# Patient Record
Sex: MALE | Race: WHITE | NOT HISPANIC OR LATINO | URBAN - METROPOLITAN AREA
[De-identification: names, ages, dates, MRNs, and addresses within clinical notes are randomized per-mention and may not be internally consistent; named-entity substitution may affect disease eponyms.]

---

## 2017-01-24 ENCOUNTER — AMBULATORY - HEALTHEAST (OUTPATIENT)
Dept: CARDIOLOGY | Facility: CLINIC | Age: 62
End: 2017-01-24

## 2017-01-25 ENCOUNTER — OFFICE VISIT - HEALTHEAST (OUTPATIENT)
Dept: CARDIOLOGY | Facility: CLINIC | Age: 62
End: 2017-01-25

## 2017-01-25 DIAGNOSIS — E66.9 OBESITY (BMI 35.0-39.9 WITHOUT COMORBIDITY): ICD-10-CM

## 2017-01-25 DIAGNOSIS — I25.10 CORONARY ARTERY DISEASE INVOLVING NATIVE CORONARY ARTERY OF NATIVE HEART WITHOUT ANGINA PECTORIS: ICD-10-CM

## 2017-01-25 DIAGNOSIS — E78.00 PURE HYPERCHOLESTEROLEMIA: ICD-10-CM

## 2017-01-25 DIAGNOSIS — N18.30 CKD (CHRONIC KIDNEY DISEASE) STAGE 3, GFR 30-59 ML/MIN (H): ICD-10-CM

## 2017-01-25 DIAGNOSIS — E78.1 HYPERTRIGLYCERIDEMIA: ICD-10-CM

## 2017-01-25 DIAGNOSIS — M54.50 LUMBAGO: ICD-10-CM

## 2017-01-25 ASSESSMENT — MIFFLIN-ST. JEOR: SCORE: 2296.74

## 2017-08-09 ENCOUNTER — COMMUNICATION - HEALTHEAST (OUTPATIENT)
Dept: CARDIOLOGY | Facility: CLINIC | Age: 62
End: 2017-08-09

## 2018-02-02 ENCOUNTER — RECORDS - HEALTHEAST (OUTPATIENT)
Dept: LAB | Facility: CLINIC | Age: 63
End: 2018-02-02

## 2018-02-02 LAB
ANION GAP SERPL CALCULATED.3IONS-SCNC: 12 MMOL/L (ref 5–18)
BUN SERPL-MCNC: 13 MG/DL (ref 8–22)
CALCIUM SERPL-MCNC: 9.4 MG/DL (ref 8.5–10.5)
CHLORIDE BLD-SCNC: 101 MMOL/L (ref 98–107)
CHOLEST SERPL-MCNC: 192 MG/DL
CO2 SERPL-SCNC: 27 MMOL/L (ref 22–31)
CREAT SERPL-MCNC: 0.93 MG/DL (ref 0.7–1.3)
FASTING STATUS PATIENT QL REPORTED: ABNORMAL
GFR SERPL CREATININE-BSD FRML MDRD: >60 ML/MIN/1.73M2
GLUCOSE BLD-MCNC: 104 MG/DL (ref 70–125)
HDLC SERPL-MCNC: 28 MG/DL
LDLC SERPL CALC-MCNC: 101 MG/DL
LDLC SERPL CALC-MCNC: ABNORMAL MG/DL
POTASSIUM BLD-SCNC: 4.5 MMOL/L (ref 3.5–5)
SODIUM SERPL-SCNC: 140 MMOL/L (ref 136–145)
TRIGL SERPL-MCNC: 651 MG/DL

## 2018-02-05 ASSESSMENT — MIFFLIN-ST. JEOR: SCORE: 2317.49

## 2018-02-06 ENCOUNTER — ANESTHESIA - HEALTHEAST (OUTPATIENT)
Dept: SURGERY | Facility: CLINIC | Age: 63
End: 2018-02-06

## 2018-02-06 ENCOUNTER — SURGERY - HEALTHEAST (OUTPATIENT)
Dept: SURGERY | Facility: CLINIC | Age: 63
End: 2018-02-06

## 2018-02-06 ASSESSMENT — MIFFLIN-ST. JEOR: SCORE: 2279.22

## 2018-03-27 ENCOUNTER — RECORDS - HEALTHEAST (OUTPATIENT)
Dept: ADMINISTRATIVE | Facility: OTHER | Age: 63
End: 2018-03-27

## 2018-03-27 ENCOUNTER — HOSPITAL ENCOUNTER (OUTPATIENT)
Dept: CT IMAGING | Facility: HOSPITAL | Age: 63
Discharge: HOME | End: 2018-03-27
Attending: FAMILY MEDICINE

## 2018-03-27 DIAGNOSIS — R07.89 RIGHT-SIDED CHEST WALL PAIN: ICD-10-CM

## 2018-03-27 DIAGNOSIS — Z86.718 HISTORY OF DVT IN ADULTHOOD: ICD-10-CM

## 2018-03-27 LAB
CREAT BLD-MCNC: 1.2 MG/DL
POC GFR AMER AF HE - HISTORICAL: >60 ML/MIN/1.73M2
POC GFR NON AMER AF HE - HISTORICAL: >60 ML/MIN/1.73M2

## 2018-06-14 ENCOUNTER — RECORDS - HEALTHEAST (OUTPATIENT)
Dept: ADMINISTRATIVE | Facility: OTHER | Age: 63
End: 2018-06-14

## 2018-06-14 ENCOUNTER — AMBULATORY - HEALTHEAST (OUTPATIENT)
Dept: CARDIOLOGY | Facility: CLINIC | Age: 63
End: 2018-06-14

## 2018-06-15 ENCOUNTER — OFFICE VISIT - HEALTHEAST (OUTPATIENT)
Dept: CARDIOLOGY | Facility: CLINIC | Age: 63
End: 2018-06-15

## 2018-06-15 DIAGNOSIS — I25.83 CORONARY ARTERY DISEASE DUE TO LIPID RICH PLAQUE: ICD-10-CM

## 2018-06-15 DIAGNOSIS — I25.10 CORONARY ARTERY DISEASE DUE TO LIPID RICH PLAQUE: ICD-10-CM

## 2018-06-15 DIAGNOSIS — R07.9 CHEST PAIN, UNSPECIFIED TYPE: ICD-10-CM

## 2018-06-15 RX ORDER — NITROGLYCERIN 0.4 MG/1
0.4 TABLET SUBLINGUAL EVERY 5 MIN PRN
Qty: 25 TABLET | Refills: 2 | Status: SHIPPED | OUTPATIENT
Start: 2018-06-15 | End: 2019-06-15

## 2018-06-15 ASSESSMENT — MIFFLIN-ST. JEOR: SCORE: 2362.85

## 2018-06-21 ENCOUNTER — HOSPITAL ENCOUNTER (OUTPATIENT)
Dept: NUCLEAR MEDICINE | Facility: CLINIC | Age: 63
Discharge: HOME | End: 2018-06-21
Attending: INTERNAL MEDICINE

## 2018-06-21 ENCOUNTER — HOSPITAL ENCOUNTER (OUTPATIENT)
Dept: CARDIOLOGY | Facility: CLINIC | Age: 63
Discharge: HOME | End: 2018-06-21
Attending: INTERNAL MEDICINE

## 2018-06-21 DIAGNOSIS — I25.83 CORONARY ARTERY DISEASE DUE TO LIPID RICH PLAQUE: ICD-10-CM

## 2018-06-21 DIAGNOSIS — I25.10 CORONARY ARTERY DISEASE DUE TO LIPID RICH PLAQUE: ICD-10-CM

## 2018-06-21 DIAGNOSIS — R07.9 CHEST PAIN, UNSPECIFIED TYPE: ICD-10-CM

## 2018-06-21 LAB
CV STRESS CURRENT BP HE: NORMAL
CV STRESS CURRENT HR HE: 100
CV STRESS CURRENT HR HE: 100
CV STRESS CURRENT HR HE: 101
CV STRESS CURRENT HR HE: 102
CV STRESS CURRENT HR HE: 103
CV STRESS CURRENT HR HE: 105
CV STRESS CURRENT HR HE: 109
CV STRESS CURRENT HR HE: 115
CV STRESS CURRENT HR HE: 118
CV STRESS CURRENT HR HE: 122
CV STRESS CURRENT HR HE: 133
CV STRESS CURRENT HR HE: 135
CV STRESS CURRENT HR HE: 135
CV STRESS CURRENT HR HE: 149
CV STRESS CURRENT HR HE: 151
CV STRESS CURRENT HR HE: 151
CV STRESS CURRENT HR HE: 80
CV STRESS CURRENT HR HE: 86
CV STRESS CURRENT HR HE: 89
CV STRESS CURRENT HR HE: 91
CV STRESS CURRENT HR HE: 93
CV STRESS CURRENT HR HE: 93
CV STRESS CURRENT HR HE: 98
CV STRESS DEVIATION TIME HE: NORMAL
CV STRESS ECHO PERCENT HR HE: NORMAL
CV STRESS EXERCISE STAGE HE: NORMAL
CV STRESS EXERCISE STAGE REACHED HE: NORMAL
CV STRESS FINAL RESTING BP HE: NORMAL
CV STRESS FINAL RESTING HR HE: 89
CV STRESS MAX HR HE: 151
CV STRESS MAX TREADMILL GRADE HE: 12
CV STRESS MAX TREADMILL SPEED HE: 2.5
CV STRESS PEAK DIA BP HE: NORMAL
CV STRESS PEAK SYS BP HE: NORMAL
CV STRESS PHASE HE: NORMAL
CV STRESS PROTOCOL HE: NORMAL
CV STRESS RESTING PT POSITION HE: NORMAL
CV STRESS ST DEVIATION AMOUNT HE: NORMAL
CV STRESS ST DEVIATION ELEVATION HE: NORMAL
CV STRESS ST EVELATION AMOUNT HE: NORMAL
CV STRESS TEST TYPE HE: NORMAL
CV STRESS TOTAL STAGE TIME MIN 1 HE: NORMAL
NUC STRESS EJECTION FRACTION: 73 %
STRESS ECHO BASELINE BP: NORMAL
STRESS ECHO BASELINE HR: 75
STRESS ECHO CALCULATED PERCENT HR: 95 %
STRESS ECHO LAST STRESS BP: NORMAL
STRESS ECHO LAST STRESS HR: 149
STRESS ECHO POST ESTIMATED WORKLOAD: 6.8
STRESS ECHO POST EXERCISE DUR MIN: 4
STRESS ECHO POST EXERCISE DUR SEC: 52
STRESS ECHO TARGET HR: 135

## 2018-06-22 ENCOUNTER — RECORDS - HEALTHEAST (OUTPATIENT)
Dept: NUCLEAR MEDICINE | Facility: CLINIC | Age: 63
End: 2018-06-22

## 2018-06-22 DIAGNOSIS — I25.83 CORONARY ATHEROSCLEROSIS DUE TO LIPID RICH PLAQUE (CODE): ICD-10-CM

## 2018-06-22 DIAGNOSIS — R07.9 CHEST PAIN, UNSPECIFIED: ICD-10-CM

## 2018-06-22 DIAGNOSIS — I25.10 ATHEROSCLEROTIC HEART DISEASE OF NATIVE CORONARY ARTERY WITHOUT ANGINA PECTORIS: ICD-10-CM

## 2018-06-25 ENCOUNTER — AMBULATORY - HEALTHEAST (OUTPATIENT)
Dept: CARDIOLOGY | Facility: CLINIC | Age: 63
End: 2018-06-25

## 2018-06-25 DIAGNOSIS — I10 HTN (HYPERTENSION): ICD-10-CM

## 2018-06-25 RX ORDER — LISINOPRIL 10 MG/1
10 TABLET ORAL DAILY
Qty: 30 TABLET | Refills: 11 | Status: SHIPPED | OUTPATIENT
Start: 2018-06-25

## 2018-08-02 ENCOUNTER — OFFICE VISIT - HEALTHEAST (OUTPATIENT)
Dept: CARDIOLOGY | Facility: CLINIC | Age: 63
End: 2018-08-02

## 2018-08-02 DIAGNOSIS — I25.10 CORONARY ARTERY DISEASE DUE TO LIPID RICH PLAQUE: ICD-10-CM

## 2018-08-02 DIAGNOSIS — E78.5 DYSLIPIDEMIA, GOAL LDL BELOW 70: ICD-10-CM

## 2018-08-02 DIAGNOSIS — I25.83 CORONARY ARTERY DISEASE DUE TO LIPID RICH PLAQUE: ICD-10-CM

## 2018-08-02 RX ORDER — VALACYCLOVIR HYDROCHLORIDE 500 MG/1
500 TABLET, FILM COATED ORAL 2 TIMES DAILY PRN
Status: SHIPPED | COMMUNITY
Start: 2018-07-15

## 2018-08-02 RX ORDER — ACETAMINOPHEN 500 MG
500 TABLET ORAL EVERY 6 HOURS PRN
Status: SHIPPED | COMMUNITY
Start: 2018-08-02

## 2018-08-02 ASSESSMENT — MIFFLIN-ST. JEOR: SCORE: 2349.24

## 2018-08-14 ENCOUNTER — RECORDS - HEALTHEAST (OUTPATIENT)
Dept: LAB | Facility: CLINIC | Age: 63
End: 2018-08-14

## 2018-08-15 LAB
ANION GAP SERPL CALCULATED.3IONS-SCNC: 13 MMOL/L (ref 5–18)
BUN SERPL-MCNC: 16 MG/DL (ref 8–22)
CALCIUM SERPL-MCNC: 9.8 MG/DL (ref 8.5–10.5)
CHLORIDE BLD-SCNC: 104 MMOL/L (ref 98–107)
CHOLEST SERPL-MCNC: 122 MG/DL
CO2 SERPL-SCNC: 25 MMOL/L (ref 22–31)
CREAT SERPL-MCNC: 1.3 MG/DL (ref 0.7–1.3)
FASTING STATUS PATIENT QL REPORTED: NO
GFR SERPL CREATININE-BSD FRML MDRD: 56 ML/MIN/1.73M2
GLUCOSE BLD-MCNC: 103 MG/DL (ref 70–125)
HDLC SERPL-MCNC: 27 MG/DL
LDLC SERPL CALC-MCNC: 19 MG/DL
POTASSIUM BLD-SCNC: 4.5 MMOL/L (ref 3.5–5)
SODIUM SERPL-SCNC: 142 MMOL/L (ref 136–145)
TRIGL SERPL-MCNC: 378 MG/DL

## 2019-02-18 ENCOUNTER — RECORDS - HEALTHEAST (OUTPATIENT)
Dept: LAB | Facility: CLINIC | Age: 64
End: 2019-02-18

## 2019-02-18 LAB
ALBUMIN SERPL-MCNC: 4.2 G/DL (ref 3.5–5)
ALP SERPL-CCNC: 108 U/L (ref 45–120)
ALT SERPL W P-5'-P-CCNC: 48 U/L (ref 0–45)
ANION GAP SERPL CALCULATED.3IONS-SCNC: 10 MMOL/L (ref 5–18)
AST SERPL W P-5'-P-CCNC: 21 U/L (ref 0–40)
BASOPHILS # BLD AUTO: 0.1 THOU/UL (ref 0–0.2)
BASOPHILS NFR BLD AUTO: 1 % (ref 0–2)
BILIRUB SERPL-MCNC: 1.4 MG/DL (ref 0–1)
BUN SERPL-MCNC: 15 MG/DL (ref 8–22)
CALCIUM SERPL-MCNC: 9.6 MG/DL (ref 8.5–10.5)
CHLORIDE BLD-SCNC: 102 MMOL/L (ref 98–107)
CHOLEST SERPL-MCNC: 143 MG/DL
CO2 SERPL-SCNC: 26 MMOL/L (ref 22–31)
CREAT SERPL-MCNC: 0.96 MG/DL (ref 0.7–1.3)
EOSINOPHIL # BLD AUTO: 0.3 THOU/UL (ref 0–0.4)
EOSINOPHIL NFR BLD AUTO: 3 % (ref 0–6)
ERYTHROCYTE [DISTWIDTH] IN BLOOD BY AUTOMATED COUNT: 12.5 % (ref 11–14.5)
FASTING STATUS PATIENT QL REPORTED: NO
GFR SERPL CREATININE-BSD FRML MDRD: >60 ML/MIN/1.73M2
GLUCOSE BLD-MCNC: 107 MG/DL (ref 70–125)
HCT VFR BLD AUTO: 47.7 % (ref 40–54)
HDLC SERPL-MCNC: 28 MG/DL
HGB BLD-MCNC: 16.1 G/DL (ref 14–18)
LDLC SERPL CALC-MCNC: 43 MG/DL
LYMPHOCYTES # BLD AUTO: 2.8 THOU/UL (ref 0.8–4.4)
LYMPHOCYTES NFR BLD AUTO: 32 % (ref 20–40)
MCH RBC QN AUTO: 32 PG (ref 27–34)
MCHC RBC AUTO-ENTMCNC: 33.8 G/DL (ref 32–36)
MCV RBC AUTO: 95 FL (ref 80–100)
MONOCYTES # BLD AUTO: 0.5 THOU/UL (ref 0–0.9)
MONOCYTES NFR BLD AUTO: 6 % (ref 2–10)
NEUTROPHILS # BLD AUTO: 5.1 THOU/UL (ref 2–7.7)
NEUTROPHILS NFR BLD AUTO: 59 % (ref 50–70)
PLATELET # BLD AUTO: 222 THOU/UL (ref 140–440)
PMV BLD AUTO: 10.9 FL (ref 8.5–12.5)
POTASSIUM BLD-SCNC: 4.5 MMOL/L (ref 3.5–5)
PROT SERPL-MCNC: 6.9 G/DL (ref 6–8)
PSA SERPL-MCNC: 0.3 NG/ML (ref 0–4.5)
RBC # BLD AUTO: 5.03 MILL/UL (ref 4.4–6.2)
SODIUM SERPL-SCNC: 138 MMOL/L (ref 136–145)
TRIGL SERPL-MCNC: 361 MG/DL
WBC: 8.8 THOU/UL (ref 4–11)

## 2020-01-01 ENCOUNTER — RECORDS - HEALTHEAST (OUTPATIENT)
Dept: LAB | Facility: CLINIC | Age: 65
End: 2020-01-01

## 2020-01-01 LAB
25(OH)D3 SERPL-MCNC: 27.3 NG/ML (ref 30–80)
ALBUMIN SERPL-MCNC: 3.8 G/DL (ref 3.5–5)
ALBUMIN SERPL-MCNC: 4.2 G/DL (ref 3.5–5)
ALP SERPL-CCNC: 109 U/L (ref 45–120)
ALP SERPL-CCNC: 111 U/L (ref 45–120)
ALT SERPL W P-5'-P-CCNC: 49 U/L (ref 0–45)
ALT SERPL W P-5'-P-CCNC: 51 U/L (ref 0–45)
ANION GAP SERPL CALCULATED.3IONS-SCNC: 12 MMOL/L (ref 5–18)
ANION GAP SERPL CALCULATED.3IONS-SCNC: 8 MMOL/L (ref 5–18)
AST SERPL W P-5'-P-CCNC: 22 U/L (ref 0–40)
AST SERPL W P-5'-P-CCNC: 27 U/L (ref 0–40)
BACTERIA SPEC CULT: NO GROWTH
BILIRUB SERPL-MCNC: 0.5 MG/DL (ref 0–1)
BILIRUB SERPL-MCNC: 1 MG/DL (ref 0–1)
BUN SERPL-MCNC: 14 MG/DL (ref 8–22)
BUN SERPL-MCNC: 15 MG/DL (ref 8–22)
CALCIUM SERPL-MCNC: 9.5 MG/DL (ref 8.5–10.5)
CALCIUM SERPL-MCNC: 9.9 MG/DL (ref 8.5–10.5)
CHLORIDE BLD-SCNC: 102 MMOL/L (ref 98–107)
CHLORIDE BLD-SCNC: 103 MMOL/L (ref 98–107)
CHOLEST SERPL-MCNC: 143 MG/DL
CO2 SERPL-SCNC: 25 MMOL/L (ref 22–31)
CO2 SERPL-SCNC: 30 MMOL/L (ref 22–31)
CREAT SERPL-MCNC: 0.94 MG/DL (ref 0.7–1.3)
CREAT SERPL-MCNC: 1.42 MG/DL (ref 0.7–1.3)
FASTING STATUS PATIENT QL REPORTED: NO
GFR SERPL CREATININE-BSD FRML MDRD: 50 ML/MIN/1.73M2
GFR SERPL CREATININE-BSD FRML MDRD: >60 ML/MIN/1.73M2
GLUCOSE BLD-MCNC: 109 MG/DL (ref 70–125)
GLUCOSE BLD-MCNC: 126 MG/DL (ref 70–125)
HDLC SERPL-MCNC: 28 MG/DL
LDLC SERPL CALC-MCNC: 61 MG/DL
POTASSIUM BLD-SCNC: 4.3 MMOL/L (ref 3.5–5)
POTASSIUM BLD-SCNC: 4.7 MMOL/L (ref 3.5–5)
PROT SERPL-MCNC: 6.7 G/DL (ref 6–8)
PROT SERPL-MCNC: 6.8 G/DL (ref 6–8)
PSA SERPL-MCNC: 0.4 NG/ML (ref 0–4.5)
SODIUM SERPL-SCNC: 140 MMOL/L (ref 136–145)
SODIUM SERPL-SCNC: 140 MMOL/L (ref 136–145)
TRIGL SERPL-MCNC: 268 MG/DL
TSH SERPL DL<=0.005 MIU/L-ACNC: 2.18 UIU/ML (ref 0.3–5)

## 2020-01-30 ENCOUNTER — AMBULATORY - HEALTHEAST (OUTPATIENT)
Dept: PALLIATIVE MEDICINE | Facility: OTHER | Age: 65
End: 2020-01-30

## 2020-01-30 DIAGNOSIS — M54.50 BILATERAL LOW BACK PAIN WITHOUT SCIATICA: ICD-10-CM

## 2020-02-04 ENCOUNTER — AMBULATORY - HEALTHEAST (OUTPATIENT)
Dept: ADMINISTRATIVE | Facility: REHABILITATION | Age: 65
End: 2020-02-04

## 2020-02-04 DIAGNOSIS — M54.50 BILATERAL LOW BACK PAIN WITHOUT SCIATICA: ICD-10-CM

## 2020-02-06 ENCOUNTER — OFFICE VISIT - HEALTHEAST (OUTPATIENT)
Dept: PHYSICAL THERAPY | Facility: CLINIC | Age: 65
End: 2020-02-06

## 2020-02-06 DIAGNOSIS — M54.50 LUMBAR SPINE PAINFUL ON MOVEMENT: ICD-10-CM

## 2020-02-06 DIAGNOSIS — G89.29 CHRONIC MIDLINE LOW BACK PAIN WITHOUT SCIATICA: ICD-10-CM

## 2020-02-06 DIAGNOSIS — M54.50 CHRONIC MIDLINE LOW BACK PAIN WITHOUT SCIATICA: ICD-10-CM

## 2020-02-13 ENCOUNTER — OFFICE VISIT - HEALTHEAST (OUTPATIENT)
Dept: PHYSICAL THERAPY | Facility: CLINIC | Age: 65
End: 2020-02-13

## 2020-02-13 DIAGNOSIS — M54.50 LUMBAR SPINE PAINFUL ON MOVEMENT: ICD-10-CM

## 2020-02-13 DIAGNOSIS — G89.29 CHRONIC MIDLINE LOW BACK PAIN WITHOUT SCIATICA: ICD-10-CM

## 2020-02-13 DIAGNOSIS — M54.50 CHRONIC MIDLINE LOW BACK PAIN WITHOUT SCIATICA: ICD-10-CM

## 2020-02-27 ENCOUNTER — OFFICE VISIT - HEALTHEAST (OUTPATIENT)
Dept: PHYSICAL THERAPY | Facility: CLINIC | Age: 65
End: 2020-02-27

## 2020-02-27 DIAGNOSIS — M54.50 CHRONIC MIDLINE LOW BACK PAIN WITHOUT SCIATICA: ICD-10-CM

## 2020-02-27 DIAGNOSIS — M54.50 LUMBAR SPINE PAINFUL ON MOVEMENT: ICD-10-CM

## 2020-02-27 DIAGNOSIS — G89.29 CHRONIC MIDLINE LOW BACK PAIN WITHOUT SCIATICA: ICD-10-CM

## 2021-01-01 ENCOUNTER — AMBULATORY - HEALTHEAST (OUTPATIENT)
Dept: CARDIOLOGY | Facility: CLINIC | Age: 66
End: 2021-01-01

## 2021-01-01 ENCOUNTER — RECORDS - HEALTHEAST (OUTPATIENT)
Dept: ADMINISTRATIVE | Facility: OTHER | Age: 66
End: 2021-01-01

## 2021-05-28 ENCOUNTER — RECORDS - HEALTHEAST (OUTPATIENT)
Dept: ADMINISTRATIVE | Facility: CLINIC | Age: 66
End: 2021-05-28

## 2021-05-30 VITALS — WEIGHT: 309.8 LBS | HEIGHT: 76 IN | BODY MASS INDEX: 37.72 KG/M2

## 2021-05-31 ENCOUNTER — RECORDS - HEALTHEAST (OUTPATIENT)
Dept: ADMINISTRATIVE | Facility: CLINIC | Age: 66
End: 2021-05-31

## 2021-06-01 ENCOUNTER — RECORDS - HEALTHEAST (OUTPATIENT)
Dept: ADMINISTRATIVE | Facility: CLINIC | Age: 66
End: 2021-06-01

## 2021-06-01 VITALS — BODY MASS INDEX: 39.17 KG/M2 | HEIGHT: 75 IN | WEIGHT: 315 LBS

## 2021-06-01 VITALS — BODY MASS INDEX: 38.37 KG/M2 | HEIGHT: 75 IN | WEIGHT: 308.56 LBS

## 2021-06-01 VITALS — BODY MASS INDEX: 39.17 KG/M2 | WEIGHT: 315 LBS | HEIGHT: 75 IN

## 2021-06-02 ENCOUNTER — RECORDS - HEALTHEAST (OUTPATIENT)
Dept: ADMINISTRATIVE | Facility: CLINIC | Age: 66
End: 2021-06-02

## 2021-06-05 NOTE — PROGRESS NOTES
[facility]   [facility]-Pel[facility] Initial Evaluation    Patient Name: Tito Dexter  Date of evaluation: 2/6/2020  Referral Diagnosis: Bilateral low back pain without sciatica  Referring provider: Sharath Smith MD  Visit Diagnosis:     ICD-10-CM    1. Chronic midline low back pain without sciatica M54.5     G89.29    2. Lumbar spine painful on movement M54.5        Assessment:        Patient is a 64 y.o. male that presents with signs and symptoms consistent with acute on chronic low back pain secondary to possible degenerative discs and facet arthropathy. Patient demonstrates impairments including decreased lumbar range of motion, joint mobility and flexibility, with + SLR leading to impaired functional mobility. Patient's functional limitations include sitting standing or walking for longer than 30 minutes, sit to stand transfers, sleeping comfortably at night, and working as . Today patient was tested on lumbar MedX and demonstrates below average strength and range of motion for age matched norms, with slight inc of pain at end range extension.    Patient educated, demonstrated understanding and is in agreement with nature of impairment, plan of care, patient role and HEP. Patient compliant with PT and prognosis is good. Patient would benefit from skilled PT to progress and improve above impairments.    The POC is dynamic and will be modified on an ongoing basis.  Patient will return to clinic if symptoms persist.  Barriers to achieving goals as noted in the assessment section may affect outcome.  Prognosis to achieve goals is  fair   Pt. is appropriate for skilled PT intervention as outlined in the Plan of Care (POC).  Pt. is a good candidate for skilled PT services to improve pain levels and function.    Goals:  Pt. will be independent with home exercise program in : 6 weeks  Pt. will have improved quality of sleep: with less pain;waking less times/night;in 6 weeks  Pt. will be able  to walk : 30 minutes;on uneven/inclined surfaces;with less pain;with less difficulty;for community mobility;for exercise/recreation;in 6 weeks  Patient will stand : 30 minutes;with less pain;with less difficultty;for home chores;for work;in 6 weeks    Pt will: demonstrate improved lumbar MEDX lumbar strength by 30# by 6 weeks.  Pt will: be able to bend forwards and backwards with no reproduction of pain symptoms by 6 weeks.       Patient's expectations/goals are realistic.    Barriers to Learning or Achieving Goals:  Non- adherence to the home exercise program.  Chronicity of the problem.       Plan / Patient Instructions:        Plan of Care:   Communication with: Referral Source  Patient Related Instruction: Nature of Condition;Treatment plan and rationale;Self Care instruction;Basis of treatment;Body mechanics;Posture;Next steps;Expected outcome  Times per Week: 1-2  Number of Weeks: 6-8  Number of Visits: 12  Discharge Planning: independent HEP and/or plateau of progress  Therapeutic Exercise: ROM;Stretching;Strengthening  Neuromuscular Reeducation: kinesio tape;posture;balance/proprioception;TNE;core  Manual Therapy: soft tissue mobilization;myofascial release;joint mobilization;muscle energy  Modalities: TENS  Gait Training: as indicated      POC and pathology of condition were reviewed with patient.  Pt. is in agreement with the Plan of Care  A Home Exercise Program (HEP) was initiated today.  Pt. was instructed in exercises by PT and patient was given a handout with detailed instructions.    Plan for next visit: lumbar DE and rotary torso if appropriate, strengthening exercises, extension exercises, posture review, HF and hamstring stretching, QL stretch     Subjective:         History of Present Illness:    Tito is a 64 y.o. male who presents to therapy today with complaints of constant back pain, has been going on for about 7 years. Date of onset is January 2020 and onset was gradual. Symptoms are  constant and not improving. Patient reports he has had about 4-5 radiofrequency ablation and this one doesn't seem to be helping his pain. He reports  a constant  history of similar symptoms. He describes their previous level of function as not limited. Patient did take work off this last week due to his low back pain, he is a . Patient did get the injections here previously.     Pain Ratin - normal pain, most of the pain is running down his legs  Pain rating at best: 4  Pain rating at worst: 8  Pain description: aching and shooting - down the legs    Functional limitations are described as occurring with:   bending  lifting  performing routine daily activities  sleeping  standing for longer periods of time, for about 30 minutes  transitional movements getting in  bed and car and getting out of  bed and car  walking for about 30 minutes    Patient reports benefit from:  rest  , anti-inflammatory, heat, cold, stretching         Objective:      Note: Items left blank indicates the item was not performed or not indicated at the time of the evaluation.    Patient Outcome Measures :    Modified Oswestry Low Back Pain Disablity Questionnaire  in %: 38     Scores range from 0-100%, where a score of 0% represents minimal pain and maximal function. The minimal clinically important difference is a score reduction of 12%.    Examination  1. Chronic midline low back pain without sciatica     2. Lumbar spine painful on movement       Involved side: Bilateral  Posture Observation:      General sitting posture is  normal.  General standing posture is normal.    Lumbar ROM:    Date: 2020     *Indicate scale AROM AROM AROM   Lumbar Flexion Fingers to knees + gowers     Lumbar Extension Limited with P      Right Left Right Left Right Left   Lumbar Sidebending  P on R P       Lumbar Rotation         Thoracic Flexion      Thoracic Extension      Thoracic Sidebending         Thoracic Rotation           Lower  Extremity Strength:   WFL no pain    Lumbar Special Tests:     Lumbar Special Tests Right Left SI Tests Right  Left   Quadrant test   SI Compression     Straight leg raise + + SI Distraction     Crossover response   POSH Test     Slump - - weakness Sacral Thrust     Sit-up test  FADIR     Trunk extensor endurance test  Resisted Abduction     Prone instability test  Other:     Pubic shotgun  Other:       Repeated Motion Testing:  Does not centralize  Does not peripheralize    Passive Mobility - Joint Integrity:  Hypomobile    Palpation: TTP lumbar paraspinals, QL, piriformis  Flexibility: decreased of HF, hamstrings      Treatment Today       Patient Instruction:  EDU on MedX and importance of exercise consistency, activity modification    Lumbar MedX Initial testing 2/6/2020 4-week Re-test   AROM (full=  0-72  lumbar) 0-30    Max Extension Torque  192#    Average Extension Torque 148#    Flex: ext ratio (ideal 1.4:1) 2.21:1          Exercises:  Exercise #1: supine knee rocks - 20 reps  Comment #1: SKTC, piriformis and lumbar rotation stretch - hold 30 sec          TREATMENT MINUTES COMMENTS   Evaluation 20    Self-care/ Home management     Manual therapy     Neuromuscular Re-education     Therapeutic Activity     Therapeutic Exercises 25 See flowsheet   Gait training     Modality__________________                Total 45 Patient arrived late   Blank areas are intentional and mean the treatment did not include these items.     PT Evaluation Code: (Please list factors)  Patient History/Comorbidities:   Patient Active Problem List   Diagnosis     Morbid obesity with BMI of 40.0-44.9, adult (H)     Chronic back pain     CKD (chronic kidney disease) stage 3, GFR 30-59 ml/min (H)     Coronary artery disease due to lipid rich plaque     Dyslipidemia, goal LDL below 70       Examination: decreased mobility increased pain  Clinical Presentation: stable  Clinical Decision Making: low    Patient History/  Comorbidities  Examination  (body structures and functions, activity limitations, and/or participation restrictions) Clinical Presentation Clinical Decision Making (Complexity)   No documented Comorbidities or personal factors 1-2 Elements Stable and/or uncomplicated Low   1-2 documented comorbidities or personal factor 3 Elements Evolving clinical presentation with changing characteristics Moderate   3-4 documented comorbidities or personal factors 4 or more Unstable and unpredictable High                Mercedez Reyes, PT  2/6/2020  8:29 AM

## 2021-06-06 NOTE — PROGRESS NOTES
[facility] Rehabilitation Daily Progress     Patient Name: Tito Dexter  Date: 2/13/2020  Visit #: 2/12  Referral Diagnosis: chronic low back pain  Referring provider: Sharath Smith MD  Visit Diagnosis:     ICD-10-CM    1. Chronic midline low back pain without sciatica M54.5     G89.29    2. Lumbar spine painful on movement M54.5        Assessment:     Today patient reports he feels about the same. His doctor says that he should maybe retire early instead of waiting 3 years, as he does have a lot of his pain and bad days the times that he works, for 10 hours straight. Tolerated lumbar DE today, slight inc of catching pain with rotary torso today, but was able to relieve symptoms with LTR.    From Eval:  Patient is a 64 y.o. male that presents with signs and symptoms consistent with acute on chronic low back pain secondary to possible degenerative discs and facet arthropathy. Patient demonstrates impairments including decreased lumbar range of motion, joint mobility and flexibility, with + SLR leading to impaired functional mobility. Patient's functional limitations include sitting standing or walking for longer than 30 minutes, sit to stand transfers, sleeping comfortably at night, and working as . Today patient was tested on lumbar MedX and demonstrates below average strength and range of motion for age matched norms, with slight inc of pain at end range extension.      HEP/POC compliance is  good .  Patient demonstrates understanding/independence with home program.  Patient is appropriate to continue with skilled physical therapy intervention, as indicated by initial plan of care.    Goal Status:  Pt. will be independent with home exercise program in : 6 weeks  Pt. will have improved quality of sleep: with less pain;waking less times/night;in 6 weeks  Pt. will be able to walk : 30 minutes;on uneven/inclined surfaces;with less pain;with less difficulty;for community mobility;for  exercise/recreation;in 6 weeks  Patient will stand : 30 minutes;with less pain;with less difficultty;for home chores;for work;in 6 weeks    Pt will: demonstrate improved lumbar MEDX lumbar strength by 30# by 6 weeks.  Pt will: be able to bend forwards and backwards with no reproduction of pain symptoms by 6 weeks.         Plan / Patient Education:     Continue with initial plan of care.  Progress with home program as tolerated.    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 Used heat and ice to help reduce symptoms yesterday.     Of note: Patient did take work off this last week due to his low back pain, he is a .    Objective:     Lumbar MedX Initial testing 20 4-week Re-test   AROM (full=  0-72  lumbar) 0-30    Max Extension Torque  192#    Average Extension Torque 148#    Flex: ext ratio (ideal 1.4:1) 2.21:1        Treatment Today       Exercises:  Exercise #1: supine knee rocks - 20 reps  Comment #1: SKTC, piriformis and lumbar rotation stretch - hold 30 sec  Exercise #2: Nustep 3 minutes  Comment #2: rotary torso 90s, 40#, starting to L - maybe decrease weight or do just 10 reps   Exercise #3: standing HF stretch - hold 30 sec  Comment #3: seated hamstring stretch - hold 30 sec       Enter Week / Visit #: W1 V2  Weight (lbs): 100# ex  Reps (#): 15  Time: 103s  ROM (degrees): 0-30 - difficulty getting all the way back to 0 at some reps   Pain: hurts at the roller pads!   Flex:Ext ratio: 2.21:1        TREATMENT MINUTES COMMENTS   Evaluation     Self-care/ Home management     Manual therapy     Neuromuscular Re-education     Therapeutic Activity     Therapeutic Exercises 30 See above flowsheet   Gait training      Modality__________________                Total 30    Blank areas are intentional and mean the treatment did not include these items.       Mercedez Reyes, PT  2/13/2020

## 2021-06-06 NOTE — PROGRESS NOTES
[facility] Rehabilitation Daily Progress     Patient Name: Tito Dexter  Date: 2/27/2020  Visit #: 3/12  Referral Diagnosis: chronic low back pain  Referring provider: Sharath Smith MD  Visit Diagnosis:     ICD-10-CM    1. Chronic midline low back pain without sciatica M54.5     G89.29    2. Lumbar spine painful on movement M54.5        Assessment:     Patient noting increased pain this visit and noting to be dreading going back to work. PT held from the MedX this visit to focus on the manual therapy trial. PT reviewed with the patient conservative measures to be completing to help him self with the pain.    From Eval:  Patient is a 64 y.o. male that presents with signs and symptoms consistent with acute on chronic low back pain secondary to possible degenerative discs and facet arthropathy. Patient demonstrates impairments including decreased lumbar range of motion, joint mobility and flexibility, with + SLR leading to impaired functional mobility. Patient's functional limitations include sitting standing or walking for longer than 30 minutes, sit to stand transfers, sleeping comfortably at night, and working as . Today patient was tested on lumbar MedX and demonstrates below average strength and range of motion for age matched norms, with slight inc of pain at end range extension.      HEP/POC compliance is  good .  Patient demonstrates understanding/independence with home program.  Patient is appropriate to continue with skilled physical therapy intervention, as indicated by initial plan of care.    Goal Status:  Pt. will be independent with home exercise program in : 6 weeks  Pt. will have improved quality of sleep: with less pain;waking less times/night;in 6 weeks  Pt. will be able to walk : 30 minutes;on uneven/inclined surfaces;with less pain;with less difficulty;for community mobility;for exercise/recreation;in 6 weeks  Patient will stand : 30 minutes;with less pain;with less  difficultty;for home chores;for work;in 6 weeks    Pt will: demonstrate improved lumbar MEDX lumbar strength by 30# by 6 weeks.  Pt will: be able to bend forwards and backwards with no reproduction of pain symptoms by 6 weeks.         Plan / Patient Education:     Continue with initial plan of care.  Progress with home program as tolerated.    Plan for next visit: lumbar DE and rotary torso, strengthening exercises, extension exercises, posture review, QL stretch    Subjective:     Pain Ratin  He is noting his pain to be bad today. He was sore for several hours after the last session and dreading going back to work.     HE doesn't think he is going to last at work. Did not like the TENS, continues with ice and heat at home.    Of note: Patient did take work off this last week due to his low back pain, he is a .    Objective:     Lumbar MedX Initial testing 20 4-week Re-test   AROM (full=  0-72  lumbar) 0-30    Max Extension Torque  192#    Average Extension Torque 148#    Flex: ext ratio (ideal 1.4:1) 2.21:1        Treatment Today       Exercises:  Exercise #1: supine knee rocks - 20 reps  Comment #1: SKTC, piriformis and lumbar rotation stretch - hold 30 sec  Exercise #2: Nustep 3 minutes  Comment #2: rotary torso 90s, 40#, starting to L - maybe decrease weight or do just 10 reps   Exercise #3: standing HF stretch - hold 30 sec  Comment #3: seated hamstring stretch - hold 30 sec       Enter Week / Visit #: W1 V2  Weight (lbs): 100# ex  Reps (#): 15  Time: 103s  ROM (degrees): 0-30 - difficulty getting all the way back to 0 at some reps   Pain: hurts at the roller pads!   Flex:Ext ratio: 2.21:1        TREATMENT MINUTES COMMENTS   Evaluation     Self-care/ Home management     Manual therapy 20 Patient positioned in prone- MFR layer III to B thoracic, lumbar paraspinals.    Patient noted reduction in pain to 5/10 following   Neuromuscular Re-education     Therapeutic Activity     Therapeutic  Exercises 3 See above flowsheet   Gait training     Modality__________________                Total 23    Blank areas are intentional and mean the treatment did not include these items.       Lisa Haywood, PT  2/27/2020

## 2021-06-07 NOTE — PROGRESS NOTES
[facility] Rehabilitation Discharge Summary  Patient Name: Tito Dexter  Date: 4/6/2020  Referral Diagnosis: Chronic LBP   Referring provider: Sharath Smith MD  Visit Diagnosis:   1. Chronic midline low back pain without sciatica     2. Lumbar spine painful on movement         Goals:  Pt. will be independent with home exercise program in : 6 weeks  Pt. will have improved quality of sleep: with less pain;waking less times/night;in 6 weeks  Pt. will be able to walk : 30 minutes;on uneven/inclined surfaces;with less pain;with less difficulty;for community mobility;for exercise/recreation;in 6 weeks  Patient will stand : 30 minutes;with less pain;with less difficultty;for home chores;for work;in 6 weeks    Pt will: demonstrate improved lumbar MEDX lumbar strength by 30# by 6 weeks.  Pt will: be able to bend forwards and backwards with no reproduction of pain symptoms by 6 weeks.       Patient was seen for 3 visits for physical therapy of chronic LBP from 2/6/20 to 2/27/20 with one no show and a few canceled appts.  appointments.   The patient attended therapy initially, but did not finish the therapy sessions prescribed.  Goals were not fully achieved. Explanation for goals not achieved: Patient did not return to measure goals  The patient discontinued therapy, did not return.  No further therapy is required at this time.    Therapy will be discontinued at this time.  The patient will need a new referral to resume physical therapy treatment. Please see below for patient's current status.    Thank you for your referral.  Mercedez Reyes, PT, DPT  4/6/2020  8:34 AM

## 2021-06-08 NOTE — PROGRESS NOTES
[facility]   Outpatient Follow-up evaluation.     Current Outpatient Prescriptions:      aspirin 81 MG EC tablet, Take 81 mg by mouth daily., Disp: , Rfl:      atorvastatin (LIPITOR) 80 MG tablet, Take 1 tablet (80 mg total) by mouth bedtime., Disp: 90 tablet, Rfl: 3     HYDROcodone-acetaminophen 5-325 mg per tablet, Take 1 tablet by mouth as needed., Disp: , Rfl:      prasugrel (EFFIENT) 10 mg Tab tablet, Take 1 tablet (10 mg total) by mouth daily., Disp: 90 tablet, Rfl: 3     VENTOLIN HFA 90 mcg/actuation inhaler, As needed, Disp: , Rfl:         Tito Dexter is a 61 y.o. Male    Chief Complaint   Patient presents with     Follow-up       Diagnoses:  Coronary artery disease.  Hyperlipidemia.  Obesity.    Recommendations:    For now continue with current medical therapy.  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  We'll arrange for that study next January.      Subjective:   Clinical follow-up appointment today.  61-year-old man.      He was seen seen in the emergency room in 2015  with an episode of chest pain. Patient ruled out for myocardial infarction and ECG showed no evolution or change. Referred to [facility] clinic for continuing evaluation of his symptoms. He's had a three-week history of progressive chest pain and discomfort. Symptoms seemed to occur without exertion but has had several episodes at rest. The dull burning sensation in the anterior chest with sharp pain in the interscapular area and some numbness in the left arm. He saw his primary care physician and they've been adjusting anti-and acid medication but without benefit or affect. He's never had a history of coronary disease in the past. He has smoked in the past but discontinued. Multiple family members with heart disease. No personal history of hypertension diabetes or hyperlipidemia. No history of stroke TIA. He has had a history of deep venous  thrombosis. History of syncope or neurologic disease.LV systolic function is normal with no segmental wall motion  Abnormalities.  Subsequent coronary angiography revealed the following:    The 1st diagonal coronary artery exhibits severe disease in the proximal segment.   Successful PCI ( drug eluting stent ) of the proximal 1st diagonal coronary artery.  A follow-up exercise stress test 7 months later gave the following result.  The exercise stress and rest images demonstrate normal left ventricular size and tracer uptake. The gated images reveal normal resting regional wall motion and global systolic performance. The measured resting ejection fraction is 69%.     Past Medical History   Diagnosis Date     Back pain      CKD (chronic kidney disease)      Coronary artery disease      Hyperlipidemia      Past Surgical History   Procedure Laterality Date     Pr knee scope,diagnostic       Description: Arthroscopy Knee Right;  Recorded: 04/08/2013;     Pr elbow arthroscop,diagnostic       Description: Arthroscopy Elbow Left;  Recorded: 04/08/2013;     Cardiac catheterization       Coronary angioplasty with stent placement  10/2015     No Known Allergies  Family History   Problem Relation Age of Onset     Coronary artery disease Mother      Coronary artery disease Father       Social History     Social History     Marital status:      Spouse name: N/A     Number of children: N/A     Years of education: N/A     Occupational History     Not on file.     Social History Main Topics     Smoking status: Former Smoker     Packs/day: 0.50     Types: Cigarettes     Quit date: 10/6/2015     Smokeless tobacco: Never Used     Alcohol use Yes      Comment: rare     Drug use: Not on file     Sexual activity: Not on file     Other Topics Concern     Not on file     Social History Narrative     Family history not pertinent to chief complaint or presenting problem    Current Outpatient Prescriptions:      aspirin 81 MG EC  tablet, Take 81 mg by mouth daily., Disp: , Rfl:      atorvastatin (LIPITOR) 80 MG tablet, Take 1 tablet (80 mg total) by mouth bedtime., Disp: 90 tablet, Rfl: 3     HYDROcodone-acetaminophen 5-325 mg per tablet, Take 1 tablet by mouth as needed., Disp: , Rfl:      prasugrel (EFFIENT) 10 mg Tab tablet, Take 1 tablet (10 mg total) by mouth daily., Disp: 90 tablet, Rfl: 3     VENTOLIN HFA 90 mcg/actuation inhaler, As needed, Disp: , Rfl:       Objective:   There were no vitals taken for this visit.      Wt Readings from Last 3 Encounters:   06/13/16 (!) 296 lb (134.3 kg)   11/30/15 (!) 287 lb 6.4 oz (130.4 kg)   11/25/15 (!) 286 lb (129.7 kg)     BP Readings from Last 3 Encounters:   06/13/16 136/86   11/30/15 120/84   11/03/15 102/70     Pulse Readings from Last 3 Encounters:   06/13/16 68   11/30/15 80   11/03/15 60     General appearance: alert, appears stated age and cooperative  Head: Normocephalic, without obvious abnormality, atraumatic  Eyes: Normal external exam without jaundice.  Ears: Normal external auricular exam.  Nose: Normal external exam.  Lungs: clear to auscultation bilaterally  Chest wall: no tenderness  Heart: regular rate and rhythm, S1, S2 normal, no murmur, click, rub or gallop   Pulses: 2+ and symmetric  Skin: Skin color, texture, turgor normal.   Neurologic: Grossly normal, no focal neurologic findings.    Review of Systems:      Cardiographics: Reviewed in clinic.    Lab Results:  Lab Results: Personally reviewed  No visits with results within 2 Month(s) from this visit.  Latest known visit with results is:    Lab Requisition on 08/15/2016   Component Date Value     Sodium 08/15/2016 141      Potassium 08/15/2016 4.4      Chloride 08/15/2016 106      CO2 08/15/2016 24      Anion Gap, Calculation 08/15/2016 11      Glucose 08/15/2016 108      BUN 08/15/2016 16      Creatinine 08/15/2016 0.96      GFR MDRD Af Amer 08/15/2016 >60      GFR MDRD Non Af Amer 08/15/2016 >60      Bilirubin, Total  08/15/2016 0.9      Calcium 08/15/2016 9.3      Protein, Total 08/15/2016 6.3      Albumin 08/15/2016 4.0      Alkaline Phosphatase 08/15/2016 115      AST 08/15/2016 18      ALT 08/15/2016 37      PSA 08/15/2016 0.3      Cholesterol 08/15/2016 120      Triglycerides 08/15/2016 254*     HDL Cholesterol 08/15/2016 25*     LDL Calculated 08/15/2016 44      Patient Fasting > 8hrs? 08/15/2016 No      WBC 08/15/2016 8.3      RBC 08/15/2016 4.85      Hemoglobin 08/15/2016 15.5      Hematocrit 08/15/2016 46.2      MCV 08/15/2016 95      MCH 08/15/2016 32.0      MCHC 08/15/2016 33.5      RDW 08/15/2016 12.5      Platelets 08/15/2016 196      MPV 08/15/2016 11.0      Neutrophils % 08/15/2016 61      Lymphocytes % 08/15/2016 29      Monocytes % 08/15/2016 6      Eosinophils % 08/15/2016 3      Basophils % 08/15/2016 0      Neutrophils Absolute 08/15/2016 5.0      Lymphocytes Absolute 08/15/2016 2.4      Monocytes Absolute 08/15/2016 0.5      Eosinophils Absolute 08/15/2016 0.3      Basophils Absolute 08/15/2016 0.0        Clinical evaluation time today including exam 20 minutes.  At least 50% of clinic evaluation time involved in assessment and patient counseling.  Part of this chart was created using a dictation software.  Typographic errors, word substitutions, and grammatical errors may unintentionally occur.    Juancarlos Howard M.D.  [facility]

## 2021-06-15 NOTE — ANESTHESIA PREPROCEDURE EVALUATION
Anesthesia Evaluation      Patient summary reviewed   No history of anesthetic complications     Airway   Mallampati: III  Neck ROM: full   Pulmonary - normal exam    breath sounds clear to auscultation  (+) a smoker (Quit Dec 2017)                         Cardiovascular   (+) CAD (s/p INES in 2015. Not on Plavix.), , hypercholesterolemia,     (-) murmur  ECG reviewed  Rhythm: regular  Rate: normal,    no murmur      Neuro/Psych    (+) chronic pain    Endo/Other    (+) obesity,      GI/Hepatic/Renal    (+)   chronic renal disease,           Dental    (+) bridge                       Anesthesia Plan  Planned anesthetic: MAC    ASA 3   Induction: intravenous   Anesthetic plan and risks discussed with: patient    Post-op plan: routine recovery

## 2021-06-15 NOTE — ANESTHESIA CARE TRANSFER NOTE
Last vitals:   Vitals:    02/06/18 1050   BP: (!) 150/91   Pulse: 73   Resp: 24   Temp: 37  C (98.6  F)   SpO2: 100%     Patient's level of consciousness is awake  Spontaneous respirations: yes  Maintains airway independently: yes  Dentition unchanged: yes  Oropharynx: oropharynx clear of all foreign objects    QCDR Measures:  ASA# 20 - Surgical No Data Recorded  PQRS# 430 - Adult PONV Prevention: 4558F - Pt received => 2 anti-emetic agents (different classes) preop & intraop  ASA# 8 - Peds PONV Prevention: NA - Not pediatric patient, not GA or 2 or more risk factors NOT present  PQRS# 424 - Miesha-op Temp Management: 4559F - At least one body temp DOCUMENTED => 35.5C or 95.9F within required timeframe  PQRS# 426 - PACU Transfer Protocol: - Transfer of care checklist used  ASA# 14 - Acute Post-op Pain: ASA14B - Patient did NOT experience pain >= 7 out of 10

## 2021-06-15 NOTE — ANESTHESIA POSTPROCEDURE EVALUATION
Patient: Tito Dexter  UMBILICAL HERNIA REPAIR  Anesthesia type: MAC    Patient location: Phase II Recovery  Last vitals:   Vitals:    02/06/18 1245   BP: (!) 155/99   Pulse: 70   Resp: 20   Temp:    SpO2: 96%     Post vital signs: stable  Level of consciousness: awake and responds to simple questions  Post-anesthesia pain: pain controlled  Post-anesthesia nausea and vomiting: no  Pulmonary: unassisted, return to baseline  Cardiovascular: stable and blood pressure at baseline  Hydration: adequate  Anesthetic events: no    QCDR Measures:  ASA# 11 - Miesha-op Cardiac Arrest: ASA11B - Patient did NOT experience unanticipated cardiac arrest  ASA# 12 - Miesha-op Mortality Rate: ASA12B - Patient did NOT die  ASA# 13 - PACU Re-Intubation Rate: NA - No ETT / LMA used for case  ASA# 10 - Composite Anes Safety: ASA10A - No serious adverse event    Additional Notes:

## 2021-06-19 NOTE — PROGRESS NOTES
[facility]   Outpatient Follow-up evaluation.      Current Outpatient Prescriptions:      aspirin 81 MG EC tablet, Take 81 mg by mouth daily., Disp: , Rfl:      atorvastatin (LIPITOR) 80 MG tablet, Take 1 tablet (80 mg total) by mouth bedtime., Disp: 90 tablet, Rfl: 3     lisinopril (PRINIVIL,ZESTRIL) 10 MG tablet, Take 1 tablet (10 mg total) by mouth daily., Disp: 30 tablet, Rfl: 11     nitroglycerin (NITROSTAT) 0.4 MG SL tablet, Place 1 tablet (0.4 mg total) under the tongue every 5 (five) minutes as needed for chest pain., Disp: 25 tablet, Rfl: 2     traMADol (ULTRAM) 50 mg tablet, Take 50 mg by mouth every 6 (six) hours as needed for pain., Disp: , Rfl:      valACYclovir (VALTREX) 500 MG tablet, Take 500 mg by mouth 2 (two) times a day as needed., Disp: , Rfl:         Tito Dexter is a 62 y.o. Male    Chief Complaint   Patient presents with     Follow-up       Diagnoses:(See Problem list)     Chest pain noncardiac  CAD previous coronary stent RCA  HTN  HLD      Recommendations:    Chest pain of noncardiac etiology normal stress nuclear study with high rate rate pressure product      Subjective:   Patient developed a recurrent episode of chest pain for which she was seen in the ER and then referred to Dr. Thompson in the [facility].  He says this symptom was distinctly different from any of the symptoms that he had with his angina.  His angina was characterized by mid burning pain.  This was distinctly different both in quality and location.      Seen in [facility] in June for chest pain with stress at work.    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 It lasted for hours intensely and there was associated anterior chest burning. The latter symptom was similar to what he had prior to coronary stenting.  He is still having the symptoms to a very minimal degree 2 days later.  They did not change with movement or exertion.  He states they were improved by morphine given in the  emergency room.          2015 Angiogram Procedure Summary   Arterial access obtained via right radial artery.   Right dominant coronary arterial system.   Normal LVEDP without significant gradient on aortic valve   pullback.   LV systolic function is normal with no segmental wall motion   abnormalities.   The 1st diagonal coronary artery exhibits severe disease in   the proximal segment.   Successful PCI ( drug eluting stent ) of the proximal 1st   diagonal coronary artery.   2018 NXT Conclusion     The patient's exercise capacity is moderately impaired, exercising for 4 minutes 45 seconds.    There is moderate to severe resting hypertension with a severe hypertensive response to exercise. Peak blood pressure in early recovery was 246/114 mmHg.    No symptoms of angina or chest pain reported with exercise.    The stress electrocardiogram is negative for inducible ischemic EKG changes.    The exercise nuclear stress test is negative for inducible myocardial ischemia or infarction.    The left ventricular ejection fraction is 73%.    When compared to the images of 6/6/2016, there have been changes noted; exercise duration decreased by 1 minute. There is now significant resting hypertension wtih a hypertensive response to exercise noted.         Past Medical History:   Diagnosis Date     Chronic pain      CKD (chronic kidney disease)      Coronary artery disease due to lipid rich plaque 10/14/2015    Severe 1st diagonal, INES 2.75      DVT (deep venous thrombosis) (H)      Dyslipidemia, goal LDL below 70 10/28/2015     Morbid obesity with BMI of 40.0-44.9, adult (H) 10/13/2015     Umbilical hernia      Venous insufficiency      Past Surgical History:   Procedure Laterality Date     CORONARY STENT PLACEMENT  10/2015    INES to DG     ENDOVENOUS ABLATION SAPHENOUS VEINS       FRACTURE SURGERY       CA ELBOW ARTHROSCOP,DIAGNOSTIC Left     Description: Arthroscopy Elbow Left;  Recorded: 04/08/2013;     CA KNEE  "SCOPE,DIAGNOSTIC      Description: Arthroscopy Knee Right;  Recorded: 04/08/2013;     RADIAL FRQUENCY ABLATION OF BACK       THERMAL BURN & GRAFTING RT LEG       UMBILICAL HERNIA REPAIR N/A 2/6/2018    Procedure: UMBILICAL HERNIA REPAIR;  Surgeon: Sharath Serra MD;  Location[facility] OR;  Service:      Allergies   Allergen Reactions     Cymbalta [Duloxetine] Diarrhea     Gabapentin Other (See Comments)     Achy, nausea     Lyrica [Pregabalin] Headache     Family History   Problem Relation Age of Onset     Brain cancer Mother      Asthma Mother      CABG Mother 55     Lung cancer Father      CABG Father 65     No Medical Problems Son      No Medical Problems Daughter       Social History     Social History     Marital status:      Spouse name: N/A     Number of children: 2     Years of education: N/A     Occupational History      [employer]     Social History Main Topics     Smoking status: Former Smoker     Packs/day: 0.75     Years: 50.00     Types: Cigarettes     Quit date: 12/4/2017     Smokeless tobacco: Never Used     Alcohol use Yes      Comment: rare     Drug use: No     Sexual activity: Not Currently     Partners: Female     Other Topics Concern     Not on file     Social History Narrative    Lives alone         Objective:   /80 (Patient Site: Left Arm, Patient Position: Sitting, Cuff Size: Adult Large)  Pulse 70  Resp 18  Ht 6' 3.25\" (1.911 m)  Wt (!) 324 lb (147 kg)  BMI 40.23 kg/m2  (!) 324 lb (147 kg)   Wt Readings from Last 3 Encounters:   08/02/18 (!) 324 lb (147 kg)   06/15/18 (!) 327 lb (148.3 kg)   06/13/18 (!) 332 lb 3 oz (150.7 kg)     BP Readings from Last 3 Encounters:   08/02/18 116/80   06/15/18 142/78   06/13/18 (!) 161/100     Pulse Readings from Last 3 Encounters:   08/02/18 70   06/15/18 68   06/13/18 63     General appearance: alert, appears stated age and cooperative  Head: Normocephalic, without obvious abnormality, atraumatic  Eyes: Normal " external exam without jaundice.  Ears: Normal external auricular exam.  Nose: Normal external exam.  Lungs: clear to auscultation bilaterally  Chest wall: no tenderness  Heart: regular rate and rhythm, S1, S2 normal, no murmur, click, rub or gallop   Pulses: 2+ and symmetric  Skin: Skin color, texture, turgor normal.   Neurologic: Grossly normal, no focal neurologic findings.    Review of Systems:   General: WNL  Cardiographics: Reviewed in clinic.    Lab Results:  Lab Results: Personally reviewed  Lab Results   Component Value Date    CHOL 192 02/02/2018    TRIG 651 (H) 02/02/2018    HDL 28 (L) 02/02/2018    LDLDIRECT 101 02/02/2018       Clinical evaluation time today including exam 30 minutes.  At least 50% of clinic evaluation time involved in assessment and patient counseling.  Part of this chart was created using a dictation software.  Typographic errors, word substitutions, and grammatical errors may unintentionally occur.    Juancarlos Howard M.D.  [facility]
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
